# Patient Record
Sex: MALE | Race: WHITE | NOT HISPANIC OR LATINO | Employment: OTHER | URBAN - METROPOLITAN AREA
[De-identification: names, ages, dates, MRNs, and addresses within clinical notes are randomized per-mention and may not be internally consistent; named-entity substitution may affect disease eponyms.]

---

## 2022-10-16 ENCOUNTER — HOSPITAL ENCOUNTER (INPATIENT)
Facility: MEDICAL CENTER | Age: 82
LOS: 1 days | DRG: 871 | End: 2022-10-17
Attending: EMERGENCY MEDICINE | Admitting: STUDENT IN AN ORGANIZED HEALTH CARE EDUCATION/TRAINING PROGRAM
Payer: MEDICARE

## 2022-10-16 ENCOUNTER — APPOINTMENT (OUTPATIENT)
Dept: RADIOLOGY | Facility: MEDICAL CENTER | Age: 82
DRG: 871 | End: 2022-10-16
Attending: EMERGENCY MEDICINE
Payer: MEDICARE

## 2022-10-16 DIAGNOSIS — E53.8 VITAMIN B12 DEFICIENCY: ICD-10-CM

## 2022-10-16 DIAGNOSIS — D50.9 IRON DEFICIENCY ANEMIA, UNSPECIFIED IRON DEFICIENCY ANEMIA TYPE: ICD-10-CM

## 2022-10-16 DIAGNOSIS — R10.10 PAIN OF UPPER ABDOMEN: ICD-10-CM

## 2022-10-16 DIAGNOSIS — K92.2 GASTROINTESTINAL HEMORRHAGE, UNSPECIFIED GASTROINTESTINAL HEMORRHAGE TYPE: ICD-10-CM

## 2022-10-16 DIAGNOSIS — J69.0 ASPIRATION PNEUMONIA OF RIGHT LOWER LOBE, UNSPECIFIED ASPIRATION PNEUMONIA TYPE (HCC): ICD-10-CM

## 2022-10-16 DIAGNOSIS — Z78.9 ALCOHOL USE: ICD-10-CM

## 2022-10-16 PROBLEM — E87.6 HYPOKALEMIA: Status: ACTIVE | Noted: 2022-10-16

## 2022-10-16 PROBLEM — E87.20 LACTIC ACIDOSIS: Status: ACTIVE | Noted: 2022-10-16

## 2022-10-16 PROBLEM — Z71.89 ADVANCE CARE PLANNING: Status: ACTIVE | Noted: 2022-10-16

## 2022-10-16 PROBLEM — K92.1 GASTROINTESTINAL HEMORRHAGE WITH MELENA: Status: ACTIVE | Noted: 2022-10-16

## 2022-10-16 PROBLEM — G93.41 ACUTE METABOLIC ENCEPHALOPATHY: Status: ACTIVE | Noted: 2022-10-16

## 2022-10-16 PROBLEM — A41.9 SEPSIS (HCC): Status: ACTIVE | Noted: 2022-10-16

## 2022-10-16 LAB
ABO + RH BLD: NORMAL
ABO GROUP BLD: NORMAL
ALBUMIN SERPL BCP-MCNC: 3.4 G/DL (ref 3.2–4.9)
ALBUMIN/GLOB SERPL: 1 G/DL
ALP SERPL-CCNC: 36 U/L (ref 30–99)
ALT SERPL-CCNC: <5 U/L (ref 2–50)
ANION GAP SERPL CALC-SCNC: 15 MMOL/L (ref 7–16)
APPEARANCE UR: CLEAR
APTT PPP: 30.3 SEC (ref 24.7–36)
AST SERPL-CCNC: 14 U/L (ref 12–45)
BACTERIA #/AREA URNS HPF: NEGATIVE /HPF
BASOPHILS # BLD AUTO: 0.4 % (ref 0–1.8)
BASOPHILS # BLD: 0.06 K/UL (ref 0–0.12)
BILIRUB SERPL-MCNC: 1.2 MG/DL (ref 0.1–1.5)
BILIRUB UR QL STRIP.AUTO: NEGATIVE
BLD GP AB SCN SERPL QL: NORMAL
BUN SERPL-MCNC: 12 MG/DL (ref 8–22)
CALCIUM SERPL-MCNC: 8.5 MG/DL (ref 8.5–10.5)
CFT BLD TEG: 5.4 MIN (ref 4.6–9.1)
CFT P HPASE BLD TEG: 5.2 MIN (ref 4.3–8.3)
CHLORIDE SERPL-SCNC: 97 MMOL/L (ref 96–112)
CLOT ANGLE BLD TEG: 75.1 DEGREES (ref 63–78)
CLOT LYSIS 30M P MA LENFR BLD TEG: 0 % (ref 0–2.6)
CO2 SERPL-SCNC: 24 MMOL/L (ref 20–33)
COLOR UR: ABNORMAL
CREAT SERPL-MCNC: 0.73 MG/DL (ref 0.5–1.4)
CT.EXTRINSIC BLD ROTEM: 1.1 MIN (ref 0.8–2.1)
EKG IMPRESSION: NORMAL
EOSINOPHIL # BLD AUTO: 0.16 K/UL (ref 0–0.51)
EOSINOPHIL NFR BLD: 1 % (ref 0–6.9)
EPI CELLS #/AREA URNS HPF: NEGATIVE /HPF
ERYTHROCYTE [DISTWIDTH] IN BLOOD BY AUTOMATED COUNT: 60.9 FL (ref 35.9–50)
FERRITIN SERPL-MCNC: 33.2 NG/ML (ref 22–322)
GFR SERPLBLD CREATININE-BSD FMLA CKD-EPI: 90 ML/MIN/1.73 M 2
GLOBULIN SER CALC-MCNC: 3.3 G/DL (ref 1.9–3.5)
GLUCOSE SERPL-MCNC: 177 MG/DL (ref 65–99)
GLUCOSE UR STRIP.AUTO-MCNC: NEGATIVE MG/DL
HCT VFR BLD AUTO: 42.7 % (ref 42–52)
HGB BLD-MCNC: 13 G/DL (ref 14–18)
HGB BLD-MCNC: 13.6 G/DL (ref 14–18)
HGB RETIC QN AUTO: 27.3 PG/CELL (ref 29–35)
HYALINE CASTS #/AREA URNS LPF: ABNORMAL /LPF
IMM GRANULOCYTES # BLD AUTO: 0.15 K/UL (ref 0–0.11)
IMM GRANULOCYTES NFR BLD AUTO: 1 % (ref 0–0.9)
IMM RETICS NFR: 22.9 % (ref 9.3–17.4)
INR PPP: 1.16 (ref 0.87–1.13)
INR PPP: 1.16 (ref 0.87–1.13)
IRON SATN MFR SERPL: 10 % (ref 15–55)
IRON SERPL-MCNC: 27 UG/DL (ref 50–180)
KETONES UR STRIP.AUTO-MCNC: 15 MG/DL
LACTATE SERPL-SCNC: 2 MMOL/L (ref 0.5–2)
LACTATE SERPL-SCNC: 4.9 MMOL/L (ref 0.5–2)
LEUKOCYTE ESTERASE UR QL STRIP.AUTO: NEGATIVE
LIPASE SERPL-CCNC: 10 U/L (ref 11–82)
LYMPHOCYTES # BLD AUTO: 0.42 K/UL (ref 1–4.8)
LYMPHOCYTES NFR BLD: 2.7 % (ref 22–41)
MAGNESIUM SERPL-MCNC: 1.3 MG/DL (ref 1.5–2.5)
MCF BLD TEG: 63.8 MM (ref 52–69)
MCF.PLATELET INHIB BLD ROTEM: 20.4 MM (ref 15–32)
MCH RBC QN AUTO: 24.2 PG (ref 27–33)
MCHC RBC AUTO-ENTMCNC: 30.4 G/DL (ref 33.7–35.3)
MCV RBC AUTO: 79.4 FL (ref 81.4–97.8)
MICRO URNS: ABNORMAL
MONOCYTES # BLD AUTO: 0.82 K/UL (ref 0–0.85)
MONOCYTES NFR BLD AUTO: 5.2 % (ref 0–13.4)
NEUTROPHILS # BLD AUTO: 14.03 K/UL (ref 1.82–7.42)
NEUTROPHILS NFR BLD: 89.7 % (ref 44–72)
NITRITE UR QL STRIP.AUTO: NEGATIVE
NRBC # BLD AUTO: 0 K/UL
NRBC BLD-RTO: 0 /100 WBC
PH UR STRIP.AUTO: 5 [PH] (ref 5–8)
PLATELET # BLD AUTO: 270 K/UL (ref 164–446)
PMV BLD AUTO: 10.2 FL (ref 9–12.9)
POTASSIUM SERPL-SCNC: 2.9 MMOL/L (ref 3.6–5.5)
PROCALCITONIN SERPL-MCNC: 0.63 NG/ML
PROT SERPL-MCNC: 6.7 G/DL (ref 6–8.2)
PROT UR QL STRIP: NEGATIVE MG/DL
PROTHROMBIN TIME: 14.6 SEC (ref 12–14.6)
PROTHROMBIN TIME: 14.7 SEC (ref 12–14.6)
RBC # BLD AUTO: 5.38 M/UL (ref 4.7–6.1)
RBC # URNS HPF: ABNORMAL /HPF
RBC UR QL AUTO: ABNORMAL
RETICS # AUTO: 0.15 M/UL (ref 0.04–0.06)
RETICS/RBC NFR: 2.6 % (ref 0.8–2.1)
RH BLD: NORMAL
SODIUM SERPL-SCNC: 136 MMOL/L (ref 135–145)
SP GR UR STRIP.AUTO: 1.02
TEG ALGORITHM TGALG: NORMAL
TIBC SERPL-MCNC: 279 UG/DL (ref 250–450)
UIBC SERPL-MCNC: 252 UG/DL (ref 110–370)
UROBILINOGEN UR STRIP.AUTO-MCNC: 1 MG/DL
WBC # BLD AUTO: 15.6 K/UL (ref 4.8–10.8)
WBC #/AREA URNS HPF: ABNORMAL /HPF

## 2022-10-16 PROCEDURE — 700117 HCHG RX CONTRAST REV CODE 255: Performed by: EMERGENCY MEDICINE

## 2022-10-16 PROCEDURE — 99223 1ST HOSP IP/OBS HIGH 75: CPT | Mod: AI | Performed by: STUDENT IN AN ORGANIZED HEALTH CARE EDUCATION/TRAINING PROGRAM

## 2022-10-16 PROCEDURE — 85018 HEMOGLOBIN: CPT

## 2022-10-16 PROCEDURE — 86901 BLOOD TYPING SEROLOGIC RH(D): CPT

## 2022-10-16 PROCEDURE — 83735 ASSAY OF MAGNESIUM: CPT

## 2022-10-16 PROCEDURE — 93005 ELECTROCARDIOGRAM TRACING: CPT | Performed by: EMERGENCY MEDICINE

## 2022-10-16 PROCEDURE — 82728 ASSAY OF FERRITIN: CPT

## 2022-10-16 PROCEDURE — 85730 THROMBOPLASTIN TIME PARTIAL: CPT

## 2022-10-16 PROCEDURE — 700101 HCHG RX REV CODE 250: Performed by: STUDENT IN AN ORGANIZED HEALTH CARE EDUCATION/TRAINING PROGRAM

## 2022-10-16 PROCEDURE — 71045 X-RAY EXAM CHEST 1 VIEW: CPT

## 2022-10-16 PROCEDURE — 36415 COLL VENOUS BLD VENIPUNCTURE: CPT

## 2022-10-16 PROCEDURE — 70450 CT HEAD/BRAIN W/O DYE: CPT

## 2022-10-16 PROCEDURE — 85384 FIBRINOGEN ACTIVITY: CPT

## 2022-10-16 PROCEDURE — 700105 HCHG RX REV CODE 258: Performed by: EMERGENCY MEDICINE

## 2022-10-16 PROCEDURE — 700105 HCHG RX REV CODE 258: Performed by: STUDENT IN AN ORGANIZED HEALTH CARE EDUCATION/TRAINING PROGRAM

## 2022-10-16 PROCEDURE — 83605 ASSAY OF LACTIC ACID: CPT | Mod: 91

## 2022-10-16 PROCEDURE — 85046 RETICYTE/HGB CONCENTRATE: CPT

## 2022-10-16 PROCEDURE — C9113 INJ PANTOPRAZOLE SODIUM, VIA: HCPCS | Performed by: EMERGENCY MEDICINE

## 2022-10-16 PROCEDURE — 99285 EMERGENCY DEPT VISIT HI MDM: CPT

## 2022-10-16 PROCEDURE — 96365 THER/PROPH/DIAG IV INF INIT: CPT

## 2022-10-16 PROCEDURE — 83690 ASSAY OF LIPASE: CPT

## 2022-10-16 PROCEDURE — 86900 BLOOD TYPING SEROLOGIC ABO: CPT

## 2022-10-16 PROCEDURE — 87040 BLOOD CULTURE FOR BACTERIA: CPT

## 2022-10-16 PROCEDURE — 81001 URINALYSIS AUTO W/SCOPE: CPT

## 2022-10-16 PROCEDURE — 83540 ASSAY OF IRON: CPT

## 2022-10-16 PROCEDURE — 700101 HCHG RX REV CODE 250: Performed by: EMERGENCY MEDICINE

## 2022-10-16 PROCEDURE — 80053 COMPREHEN METABOLIC PANEL: CPT

## 2022-10-16 PROCEDURE — 770020 HCHG ROOM/CARE - TELE (206)

## 2022-10-16 PROCEDURE — 700111 HCHG RX REV CODE 636 W/ 250 OVERRIDE (IP): Performed by: EMERGENCY MEDICINE

## 2022-10-16 PROCEDURE — 74177 CT ABD & PELVIS W/CONTRAST: CPT

## 2022-10-16 PROCEDURE — 700111 HCHG RX REV CODE 636 W/ 250 OVERRIDE (IP)

## 2022-10-16 PROCEDURE — 83550 IRON BINDING TEST: CPT

## 2022-10-16 PROCEDURE — 86850 RBC ANTIBODY SCREEN: CPT

## 2022-10-16 PROCEDURE — 96375 TX/PRO/DX INJ NEW DRUG ADDON: CPT

## 2022-10-16 PROCEDURE — HZ2ZZZZ DETOXIFICATION SERVICES FOR SUBSTANCE ABUSE TREATMENT: ICD-10-PCS | Performed by: STUDENT IN AN ORGANIZED HEALTH CARE EDUCATION/TRAINING PROGRAM

## 2022-10-16 PROCEDURE — 93005 ELECTROCARDIOGRAM TRACING: CPT

## 2022-10-16 PROCEDURE — 85025 COMPLETE CBC W/AUTO DIFF WBC: CPT

## 2022-10-16 PROCEDURE — 85347 COAGULATION TIME ACTIVATED: CPT

## 2022-10-16 PROCEDURE — 82607 VITAMIN B-12: CPT

## 2022-10-16 PROCEDURE — 85610 PROTHROMBIN TIME: CPT

## 2022-10-16 PROCEDURE — 700111 HCHG RX REV CODE 636 W/ 250 OVERRIDE (IP): Performed by: STUDENT IN AN ORGANIZED HEALTH CARE EDUCATION/TRAINING PROGRAM

## 2022-10-16 PROCEDURE — 96367 TX/PROPH/DG ADDL SEQ IV INF: CPT

## 2022-10-16 PROCEDURE — 85576 BLOOD PLATELET AGGREGATION: CPT

## 2022-10-16 PROCEDURE — 84145 PROCALCITONIN (PCT): CPT

## 2022-10-16 RX ORDER — POTASSIUM CHLORIDE 7.45 MG/ML
10 INJECTION INTRAVENOUS
Status: DISPENSED | OUTPATIENT
Start: 2022-10-16 | End: 2022-10-16

## 2022-10-16 RX ORDER — POTASSIUM CHLORIDE 29.8 MG/ML
40 INJECTION INTRAVENOUS ONCE
Status: DISCONTINUED | OUTPATIENT
Start: 2022-10-16 | End: 2022-10-16

## 2022-10-16 RX ORDER — BISACODYL 10 MG
10 SUPPOSITORY, RECTAL RECTAL
Status: DISCONTINUED | OUTPATIENT
Start: 2022-10-16 | End: 2022-10-17 | Stop reason: HOSPADM

## 2022-10-16 RX ORDER — ACETAMINOPHEN 325 MG/1
650 TABLET ORAL EVERY 6 HOURS PRN
Status: DISCONTINUED | OUTPATIENT
Start: 2022-10-16 | End: 2022-10-17 | Stop reason: HOSPADM

## 2022-10-16 RX ORDER — GAUZE BANDAGE 2" X 2"
100 BANDAGE TOPICAL DAILY
Status: DISCONTINUED | OUTPATIENT
Start: 2022-10-17 | End: 2022-10-16

## 2022-10-16 RX ORDER — POTASSIUM CHLORIDE 7.45 MG/ML
10 INJECTION INTRAVENOUS
Status: DISCONTINUED | OUTPATIENT
Start: 2022-10-16 | End: 2022-10-16

## 2022-10-16 RX ORDER — ONDANSETRON 4 MG/1
4 TABLET, ORALLY DISINTEGRATING ORAL EVERY 4 HOURS PRN
Status: DISCONTINUED | OUTPATIENT
Start: 2022-10-16 | End: 2022-10-17 | Stop reason: HOSPADM

## 2022-10-16 RX ORDER — CEFTRIAXONE 2 G/1
2 INJECTION, POWDER, FOR SOLUTION INTRAMUSCULAR; INTRAVENOUS ONCE
Status: COMPLETED | OUTPATIENT
Start: 2022-10-16 | End: 2022-10-16

## 2022-10-16 RX ORDER — POLYETHYLENE GLYCOL 3350 17 G/17G
1 POWDER, FOR SOLUTION ORAL
Status: DISCONTINUED | OUTPATIENT
Start: 2022-10-16 | End: 2022-10-17 | Stop reason: HOSPADM

## 2022-10-16 RX ORDER — LORAZEPAM 2 MG/ML
2 INJECTION INTRAMUSCULAR
Status: DISCONTINUED | OUTPATIENT
Start: 2022-10-16 | End: 2022-10-17 | Stop reason: HOSPADM

## 2022-10-16 RX ORDER — LORAZEPAM 2 MG/1
4 TABLET ORAL
Status: DISCONTINUED | OUTPATIENT
Start: 2022-10-16 | End: 2022-10-17 | Stop reason: HOSPADM

## 2022-10-16 RX ORDER — AMOXICILLIN 250 MG
2 CAPSULE ORAL 2 TIMES DAILY
Status: DISCONTINUED | OUTPATIENT
Start: 2022-10-16 | End: 2022-10-17 | Stop reason: HOSPADM

## 2022-10-16 RX ORDER — LORAZEPAM 0.5 MG/1
0.5 TABLET ORAL EVERY 4 HOURS PRN
Status: DISCONTINUED | OUTPATIENT
Start: 2022-10-16 | End: 2022-10-17 | Stop reason: HOSPADM

## 2022-10-16 RX ORDER — PANTOPRAZOLE SODIUM 40 MG/10ML
40 INJECTION, POWDER, LYOPHILIZED, FOR SOLUTION INTRAVENOUS 2 TIMES DAILY
Status: DISCONTINUED | OUTPATIENT
Start: 2022-10-17 | End: 2022-10-17

## 2022-10-16 RX ORDER — METOPROLOL TARTRATE 1 MG/ML
5 INJECTION, SOLUTION INTRAVENOUS
Status: DISCONTINUED | OUTPATIENT
Start: 2022-10-16 | End: 2022-10-17 | Stop reason: HOSPADM

## 2022-10-16 RX ORDER — LORAZEPAM 2 MG/1
2 TABLET ORAL
Status: DISCONTINUED | OUTPATIENT
Start: 2022-10-16 | End: 2022-10-17 | Stop reason: HOSPADM

## 2022-10-16 RX ORDER — HALOPERIDOL 5 MG/ML
5 INJECTION INTRAMUSCULAR
Status: DISCONTINUED | OUTPATIENT
Start: 2022-10-16 | End: 2022-10-16

## 2022-10-16 RX ORDER — FOLIC ACID 1 MG/1
1 TABLET ORAL DAILY
Status: DISCONTINUED | OUTPATIENT
Start: 2022-10-17 | End: 2022-10-16

## 2022-10-16 RX ORDER — LORAZEPAM 1 MG/1
1 TABLET ORAL EVERY 4 HOURS PRN
Status: DISCONTINUED | OUTPATIENT
Start: 2022-10-16 | End: 2022-10-17 | Stop reason: HOSPADM

## 2022-10-16 RX ORDER — ONDANSETRON 2 MG/ML
4 INJECTION INTRAMUSCULAR; INTRAVENOUS EVERY 4 HOURS PRN
Status: DISCONTINUED | OUTPATIENT
Start: 2022-10-16 | End: 2022-10-17 | Stop reason: HOSPADM

## 2022-10-16 RX ORDER — HALOPERIDOL 5 MG/ML
5 INJECTION INTRAMUSCULAR
Status: COMPLETED | OUTPATIENT
Start: 2022-10-16 | End: 2022-10-16

## 2022-10-16 RX ORDER — SODIUM CHLORIDE, SODIUM LACTATE, POTASSIUM CHLORIDE, CALCIUM CHLORIDE 600; 310; 30; 20 MG/100ML; MG/100ML; MG/100ML; MG/100ML
INJECTION, SOLUTION INTRAVENOUS CONTINUOUS
Status: DISCONTINUED | OUTPATIENT
Start: 2022-10-16 | End: 2022-10-17

## 2022-10-16 RX ORDER — MAGNESIUM SULFATE HEPTAHYDRATE 40 MG/ML
2 INJECTION, SOLUTION INTRAVENOUS ONCE
Status: COMPLETED | OUTPATIENT
Start: 2022-10-17 | End: 2022-10-17

## 2022-10-16 RX ORDER — METRONIDAZOLE 500 MG/100ML
500 INJECTION, SOLUTION INTRAVENOUS ONCE
Status: COMPLETED | OUTPATIENT
Start: 2022-10-16 | End: 2022-10-16

## 2022-10-16 RX ADMIN — METRONIDAZOLE 500 MG: 5 INJECTION, SOLUTION INTRAVENOUS at 14:50

## 2022-10-16 RX ADMIN — POTASSIUM CHLORIDE 10 MEQ: 7.46 INJECTION, SOLUTION INTRAVENOUS at 19:32

## 2022-10-16 RX ADMIN — SODIUM CHLORIDE, POTASSIUM CHLORIDE, SODIUM LACTATE AND CALCIUM CHLORIDE: 600; 310; 30; 20 INJECTION, SOLUTION INTRAVENOUS at 18:01

## 2022-10-16 RX ADMIN — CEFTRIAXONE SODIUM 2 G: 2 INJECTION, POWDER, FOR SOLUTION INTRAMUSCULAR; INTRAVENOUS at 14:48

## 2022-10-16 RX ADMIN — IOHEXOL 89 ML: 350 INJECTION, SOLUTION INTRAVENOUS at 16:29

## 2022-10-16 RX ADMIN — SODIUM CHLORIDE 80 MG: 9 INJECTION, SOLUTION INTRAVENOUS at 16:52

## 2022-10-16 RX ADMIN — SODIUM CHLORIDE 3 G: 900 INJECTION INTRAVENOUS at 21:22

## 2022-10-16 RX ADMIN — POTASSIUM CHLORIDE 10 MEQ: 7.46 INJECTION, SOLUTION INTRAVENOUS at 18:01

## 2022-10-16 RX ADMIN — DOXYCYCLINE 100 MG: 100 INJECTION, POWDER, LYOPHILIZED, FOR SOLUTION INTRAVENOUS at 22:34

## 2022-10-16 RX ADMIN — POTASSIUM CHLORIDE 10 MEQ: 7.46 INJECTION, SOLUTION INTRAVENOUS at 22:35

## 2022-10-16 RX ADMIN — HALOPERIDOL LACTATE 5 MG: 5 INJECTION, SOLUTION INTRAMUSCULAR at 23:12

## 2022-10-16 ASSESSMENT — LIFESTYLE VARIABLES
EVER HAD A DRINK FIRST THING IN THE MORNING TO STEADY YOUR NERVES TO GET RID OF A HANGOVER: NO
TOTAL SCORE: 0
AVERAGE NUMBER OF DAYS PER WEEK YOU HAVE A DRINK CONTAINING ALCOHOL: 7
CONSUMPTION TOTAL: POSITIVE
TOTAL SCORE: 0
HAVE YOU EVER FELT YOU SHOULD CUT DOWN ON YOUR DRINKING: NO
ON A TYPICAL DAY WHEN YOU DRINK ALCOHOL HOW MANY DRINKS DO YOU HAVE: 5
TOTAL SCORE: 0
EVER FELT BAD OR GUILTY ABOUT YOUR DRINKING: NO
HOW MANY TIMES IN THE PAST YEAR HAVE YOU HAD 5 OR MORE DRINKS IN A DAY: 365
ALCOHOL_USE: YES
DOES PATIENT WANT TO STOP DRINKING: NO
ALCOHOL_USE: YES
HAVE PEOPLE ANNOYED YOU BY CRITICIZING YOUR DRINKING: NO

## 2022-10-16 ASSESSMENT — FIBROSIS 4 INDEX: FIB4 SCORE: 2.03

## 2022-10-16 ASSESSMENT — PAIN DESCRIPTION - PAIN TYPE: TYPE: ACUTE PAIN

## 2022-10-16 NOTE — ED PROVIDER NOTES
ED Provider Note    Scribed for Jerardo Richard M.D. by Eber Altamirano. 10/16/2022  1:17 PM    Primary care provider: No primary care provider on file.  Means of arrival: EMS  History obtained from: Patient  History limited by: None    CHIEF COMPLAINT  Chief Complaint   Patient presents with    ALOC     Pt was playing with his grandkids this afternoon. According to ems pt wife states he was his normals self, alert and oriented. Per wife pt family left and pt suddenly became altered, lethargic, and weak. Pt vomited and became incontinet. Pt arrived alert and oriented x 2, oriented to self and place. LKW at 1200, NIH 0 at this time. Pt found to be 80% on room air. Pt placed on 4L with improvement to 96% by ems. Pt 6L at this time, O2 87-90%.    N/V     On arrival.        HPI  Eder Cobos is a 82 y.o. male who presents to the Emergency Department for evaluation of weakness onset prior to arrival. Patient was sitting in his chair when his weakness began. He was unable to walk with his walker and couldn't move his feet. Wife reports associated confusion, incontinence, vomiting, and bloody stools. Wife notes the patient's confusion has resolved and he has a history of chronic diarrhea. Patient has been taking Ibuprofen, Aleve, and Imodium with minimal alleviation to symptoms. He admits to taking hypertension medications, folic acid, and iron but denies taking any blood thinners. Wife states the patient has 2 L of O2 at home but generally doesn't use it.      REVIEW OF SYSTEMS  Pertinent positives include: weakness, confusion, incontinence, vomiting, and bloody stools. See history of present illness. All other systems are negative.   C    PAST MEDICAL HISTORY   Hypertension    SURGICAL HISTORY  patient denies any surgical history    SOCIAL HISTORY      Social History     Substance and Sexual Activity   Drug Use None noted when reviewed       FAMILY HISTORY  None noted when reviewed    CURRENT MEDICATIONS  Home  "Medications       Reviewed by Osiris Santiago R.N. (Registered Nurse) on 10/16/22 at 1257  Med List Status: Not Addressed     Medication Last Dose Status        Patient Shoaib Taking any Medications                           ALLERGIES  No Known Allergies    PHYSICAL EXAM  VITAL SIGNS: BP (!) 147/65   Pulse 69   Temp 37.7 °C (99.9 °F) (Temporal)   Resp (!) 26   Ht 1.778 m (5' 10\")   Wt 81.6 kg (180 lb)   SpO2 88%   BMI 25.83 kg/m²     Constitutional: Alert. Covered in black poop.  HENT: No signs of trauma, Bilateral external ears normal, Nose normal. Uvula midline.   Eyes: Pupils are equal and reactive, Conjunctiva normal, Non-icteric.   Neck: Normal range of motion, No tenderness, Supple, No stridor.   Lymphatic: No lymphadenopathy noted.   Cardiovascular: Regular rate and rhythm, no murmurs.   Thorax & Lungs: Normal breath sounds, No respiratory distress, No wheezing, No chest tenderness.   Abdomen:  Soft, No tenderness, No peritoneal signs, No masses, No pulsatile masses.   Skin: Warm, Dry, No erythema, No rash.   Back: No bony tenderness, No CVA tenderness.   Extremities: Intact distal pulses, No edema, No tenderness, No cyanosis.  Musculoskeletal: Good range of motion in all major joints. No tenderness to palpation or major deformities noted.   Neurologic: Alert, Moves  all 4 extremities, swinging at staff members. Normal motor function, Normal sensory function.   Psychiatric: Affect normal, Judgment normal, Mood normal.     DIAGNOSTIC STUDIES / PROCEDURES    LABS  Labs Reviewed   COMP METABOLIC PANEL - Abnormal; Notable for the following components:       Result Value    Potassium 2.9 (*)     Glucose 177 (*)     All other components within normal limits   LIPASE - Abnormal; Notable for the following components:    Lipase 10 (*)     All other components within normal limits   LACTIC ACID - Abnormal; Notable for the following components:    Lactic Acid 4.9 (*)     All other components within normal " "limits   URINALYSIS - Abnormal; Notable for the following components:    Ketones 15 (*)     Occult Blood Trace (*)     All other components within normal limits   URINE MICROSCOPIC (W/UA) - Abnormal; Notable for the following components:    WBC 0-2 (*)     RBC 2-5 (*)     Hyaline Cast 3-5 (*)     All other components within normal limits   CBC WITH DIFFERENTIAL - Abnormal; Notable for the following components:    WBC 15.6 (*)     Hemoglobin 13.0 (*)     MCV 79.4 (*)     MCH 24.2 (*)     MCHC 30.4 (*)     RDW 60.9 (*)     Neutrophils-Polys 89.70 (*)     Lymphocytes 2.70 (*)     Immature Granulocytes 1.00 (*)     Neutrophils (Absolute) 14.03 (*)     Lymphs (Absolute) 0.42 (*)     Immature Granulocytes (abs) 0.15 (*)     All other components within normal limits    Narrative:     SPECIMEN IS A RECOLLECT   PROTHROMBIN TIME - Abnormal; Notable for the following components:    INR 1.16 (*)     All other components within normal limits    Narrative:     Indicate which anticoagulants the patient is on:->UNKNOWN   IRON/TOTAL IRON BIND - Abnormal; Notable for the following components:    Iron 27 (*)     % Saturation 10 (*)     All other components within normal limits    Narrative:     cant add retcs, lav tube was used for blood bank.   MAGNESIUM - Abnormal; Notable for the following components:    Magnesium 1.3 (*)     All other components within normal limits    Narrative:     cant add retcs, lav tube was used for blood bank.   PROCALCITONIN - Abnormal; Notable for the following components:    Procalcitonin 0.63 (*)     All other components within normal limits    Narrative:     cant add retcs, lav tube was used for blood bank.   LACTIC ACID   ESTIMATED GFR   COD (ADULT)   APTT    Narrative:     Indicate which anticoagulants the patient is on:->UNKNOWN   ABO RH CONFIRM   FERRITIN   BLOOD CULTURE    Narrative:     1 of 2 for Blood Culture x 2 sites order. Per Hospital  Policy: Only change Specimen Src: to \"Line\" if specified " "by  physician order.   BLOOD CULTURE    Narrative:     2 of 2 blood culture x2  Sites order. Per Hospital Policy:  Only change Specimen Src: to \"Line\" if specified by physician  order.   HGB   LACTIC ACID   BASIC TEG   PROTHROMBIN TIME   RETICULOCYTES COUNT   HGB      All labs reviewed by me.    EKG   Report   Date Value Ref Range Status   10/16/2022       [facility] Emergency Dept.    Test Date:  2022-10-16  Pt Name:    KIM ESPOSITO               Department: ER  MRN:        [id number]                      Room:        17  Gender:     Male                         Technician: MIGUEL LU  :        1940                   Requested By:ER TRIAGE PROTOCOL  Order #:    [id number]                    Reading MD:    Measurements  Intervals                                Axis  Rate:       77                           P:          13  CO:         175                          QRS:        -16  QRSD:       97                           T:          66  QT:         451  QTc:        511    Interpretive Statements  Sinus arrhythmia  Borderline left axis deviation  Minimal ST depression, anterolateral leads  Prolonged QT interval  No previous ECG available for comparison                RADIOLOGY  CT-ABDOMEN-PELVIS WITH   Final Result      1.  Diverticulosis without active diverticulitis.      2.  Aortic atherosclerosis without aneurysm.      CT-HEAD W/O   Final Result      1.  Cerebral atrophy.      2.  White matter lucencies most consistent with small vessel ischemic change versus demyelination or gliosis.      3.  Otherwise, Head CT without contrast with no acute findings. No evidence of acute cerebral infarction, hemorrhage or mass lesion.         DX-CHEST-PORTABLE (1 VIEW)   Final Result      Mild bibasilar opacities most likely represent atelectasis and/or scarring.        The radiologist's interpretation of all radiological studies have been reviewed by me.    COURSE & MEDICAL DECISION MAKING  Nursing " notes, VS, PMSFHx reviewed in chart.    82 y.o. male p/w chief complaint of weakness.    1:17 PM Patient seen and examined at bedside.      I verified that the patient was wearing a mask and I was wearing appropriate PPE every time I entered the room. The patient's mask was on the patient at all times during my encounter except for a brief view of the oropharynx.     The differential diagnoses include but are not limited to:      large bore IVs placed. Type & screen sent.  Tachycardic, normotensive, no hemorraghic shock.      Unclear etiology at this time.   CBC w/o acute blood loss anemia, no  thrombocytopenia.  BMP w/o uremia, no significant electrolyte abnormalities. Besides K.2.9  LFTs w/o hyperbilirubinemia.  INR w/o coagulopathy.  Patient admitted to Dr. Meza.        The total critical care time on this patient is 40 minutes, resuscitating patient, speaking with admitting physician, and deciphering test results. This 40 minutes is exclusive of separately billable procedures.      4:17 PM - Patient was at CT at this time.    5:21 PM - I discussed the patient's case and the above findings with Dr. Meza (hospitalist) who agrees to admit the patient.      DISPOSITION:  Patient will be hospitalized by Dr. Rodas in critical condition.     FINAL IMPRESSION  1. Gastrointestinal hemorrhage, unspecified gastrointestinal hemorrhage type       CCT:40 minutes.     IEber (Oswald), am scribing for, and in the presence of, Jerardo Richard M.D..    Electronically signed by: Eber Altamirano (Oswald), 10/16/2022    Jerardo PÉREZ M.D. personally performed the services described in this documentation, as scribed by Eber Altamirano in my presence, and it is both accurate and complete.    The note accurately reflects work and decisions made by me.  Jerardo Richard M.D.  10/16/2022  9:39 PM

## 2022-10-16 NOTE — ED NOTES
Pt wife states pt is supposed to wear O2 at home. Pt supposed to wear 2L but doesn't. Wife states she'll check his O2 from time to time and it's usually at 90% on RA.

## 2022-10-16 NOTE — ED NOTES
from Lab called with critical result of lactic acid 4.9 at 1416. Critical lab result read back to .   Dr. Richard notified of critical lab result at 1418.  Critical lab result read back by Dr. Richard.

## 2022-10-16 NOTE — ED TRIAGE NOTES
"Chief Complaint   Patient presents with    ALOC     Pt was playing with his grandkids this afternoon. According to ems pt wife states he was his normals self, alert and oriented. Per wife pt family left and pt suddenly became altered, lethargic, and weak. Pt vomited and became incontinet. Pt arrived alert and oriented x 2, oriented to self and place. LKW at 1200, NIH 0 at this time. Pt found to be 80% on room air. Pt placed on 4L with improvement to 96% by ems. Pt 6L at this time, O2 87-90%.    N/V     On arrival.      Blood Pressure : (!) 147/65, NIBP MAP (Calculated): 94, Pulse: 69, Respiration: (!) 26, Temperature: 37.7 °C (99.9 °F), Height: 177.8 cm (5' 10\"), Weight: 81.6 kg (180 lb), BMI (Calculated): 25.83, BSA (Calculated): 2, Pulse Oximetry: 88 %, O2 (LPM): 6, O2 Delivery Device: Silicone Nasal Cannula    "

## 2022-10-17 ENCOUNTER — PHARMACY VISIT (OUTPATIENT)
Dept: PHARMACY | Facility: MEDICAL CENTER | Age: 82
End: 2022-10-17
Payer: COMMERCIAL

## 2022-10-17 VITALS
WEIGHT: 211.86 LBS | HEART RATE: 90 BPM | OXYGEN SATURATION: 95 % | BODY MASS INDEX: 30.33 KG/M2 | HEIGHT: 70 IN | RESPIRATION RATE: 18 BRPM | DIASTOLIC BLOOD PRESSURE: 81 MMHG | SYSTOLIC BLOOD PRESSURE: 162 MMHG | TEMPERATURE: 98.1 F

## 2022-10-17 PROBLEM — D50.9 IRON DEFICIENCY ANEMIA: Status: ACTIVE | Noted: 2022-10-16

## 2022-10-17 PROBLEM — A41.9 SEPSIS (HCC): Status: RESOLVED | Noted: 2022-10-16 | Resolved: 2022-10-17

## 2022-10-17 LAB
ALBUMIN SERPL BCP-MCNC: 3.2 G/DL (ref 3.2–4.9)
ALBUMIN/GLOB SERPL: 1.1 G/DL
ALP SERPL-CCNC: 33 U/L (ref 30–99)
ALT SERPL-CCNC: 5 U/L (ref 2–50)
AMMONIA PLAS-SCNC: 13 UMOL/L (ref 11–45)
ANION GAP SERPL CALC-SCNC: 13 MMOL/L (ref 7–16)
AST SERPL-CCNC: 30 U/L (ref 12–45)
BASOPHILS # BLD AUTO: 0.2 % (ref 0–1.8)
BASOPHILS # BLD: 0.03 K/UL (ref 0–0.12)
BILIRUB SERPL-MCNC: 1.2 MG/DL (ref 0.1–1.5)
BUN SERPL-MCNC: 13 MG/DL (ref 8–22)
CALCIUM SERPL-MCNC: 8.3 MG/DL (ref 8.5–10.5)
CHLORIDE SERPL-SCNC: 101 MMOL/L (ref 96–112)
CO2 SERPL-SCNC: 26 MMOL/L (ref 20–33)
CREAT SERPL-MCNC: 0.75 MG/DL (ref 0.5–1.4)
EOSINOPHIL # BLD AUTO: 0.01 K/UL (ref 0–0.51)
EOSINOPHIL NFR BLD: 0.1 % (ref 0–6.9)
ERYTHROCYTE [DISTWIDTH] IN BLOOD BY AUTOMATED COUNT: 62.4 FL (ref 35.9–50)
GFR SERPLBLD CREATININE-BSD FMLA CKD-EPI: 89 ML/MIN/1.73 M 2
GLOBULIN SER CALC-MCNC: 2.8 G/DL (ref 1.9–3.5)
GLUCOSE SERPL-MCNC: 135 MG/DL (ref 65–99)
HCT VFR BLD AUTO: 44.1 % (ref 42–52)
HGB BLD-MCNC: 13.3 G/DL (ref 14–18)
IMM GRANULOCYTES # BLD AUTO: 0.09 K/UL (ref 0–0.11)
IMM GRANULOCYTES NFR BLD AUTO: 0.7 % (ref 0–0.9)
LACTATE SERPL-SCNC: 3.1 MMOL/L (ref 0.5–2)
LYMPHOCYTES # BLD AUTO: 0.6 K/UL (ref 1–4.8)
LYMPHOCYTES NFR BLD: 4.3 % (ref 22–41)
MAGNESIUM SERPL-MCNC: 1.6 MG/DL (ref 1.5–2.5)
MCH RBC QN AUTO: 24 PG (ref 27–33)
MCHC RBC AUTO-ENTMCNC: 30.2 G/DL (ref 33.7–35.3)
MCV RBC AUTO: 79.6 FL (ref 81.4–97.8)
MONOCYTES # BLD AUTO: 0.63 K/UL (ref 0–0.85)
MONOCYTES NFR BLD AUTO: 4.6 % (ref 0–13.4)
NEUTROPHILS # BLD AUTO: 12.44 K/UL (ref 1.82–7.42)
NEUTROPHILS NFR BLD: 90.1 % (ref 44–72)
NRBC # BLD AUTO: 0 K/UL
NRBC BLD-RTO: 0 /100 WBC
PHOSPHATE SERPL-MCNC: 2.7 MG/DL (ref 2.5–4.5)
PLATELET # BLD AUTO: 251 K/UL (ref 164–446)
PMV BLD AUTO: 9.1 FL (ref 9–12.9)
POTASSIUM SERPL-SCNC: 3.1 MMOL/L (ref 3.6–5.5)
PROT SERPL-MCNC: 6 G/DL (ref 6–8.2)
RBC # BLD AUTO: 5.54 M/UL (ref 4.7–6.1)
SODIUM SERPL-SCNC: 140 MMOL/L (ref 135–145)
T PALLIDUM AB SER QL IA: NORMAL
TSH SERPL DL<=0.005 MIU/L-ACNC: 2.69 UIU/ML (ref 0.38–5.33)
VIT B12 SERPL-MCNC: <150 PG/ML (ref 211–911)
WBC # BLD AUTO: 13.8 K/UL (ref 4.8–10.8)

## 2022-10-17 PROCEDURE — 83735 ASSAY OF MAGNESIUM: CPT

## 2022-10-17 PROCEDURE — 84443 ASSAY THYROID STIM HORMONE: CPT

## 2022-10-17 PROCEDURE — C9113 INJ PANTOPRAZOLE SODIUM, VIA: HCPCS | Performed by: STUDENT IN AN ORGANIZED HEALTH CARE EDUCATION/TRAINING PROGRAM

## 2022-10-17 PROCEDURE — 86780 TREPONEMA PALLIDUM: CPT

## 2022-10-17 PROCEDURE — 82140 ASSAY OF AMMONIA: CPT

## 2022-10-17 PROCEDURE — 700111 HCHG RX REV CODE 636 W/ 250 OVERRIDE (IP): Performed by: STUDENT IN AN ORGANIZED HEALTH CARE EDUCATION/TRAINING PROGRAM

## 2022-10-17 PROCEDURE — 700101 HCHG RX REV CODE 250

## 2022-10-17 PROCEDURE — 85025 COMPLETE CBC W/AUTO DIFF WBC: CPT

## 2022-10-17 PROCEDURE — 700111 HCHG RX REV CODE 636 W/ 250 OVERRIDE (IP)

## 2022-10-17 PROCEDURE — 700105 HCHG RX REV CODE 258: Performed by: STUDENT IN AN ORGANIZED HEALTH CARE EDUCATION/TRAINING PROGRAM

## 2022-10-17 PROCEDURE — 99239 HOSP IP/OBS DSCHRG MGMT >30: CPT | Performed by: STUDENT IN AN ORGANIZED HEALTH CARE EDUCATION/TRAINING PROGRAM

## 2022-10-17 PROCEDURE — 700102 HCHG RX REV CODE 250 W/ 637 OVERRIDE(OP): Performed by: STUDENT IN AN ORGANIZED HEALTH CARE EDUCATION/TRAINING PROGRAM

## 2022-10-17 PROCEDURE — 92610 EVALUATE SWALLOWING FUNCTION: CPT

## 2022-10-17 PROCEDURE — 700101 HCHG RX REV CODE 250: Performed by: STUDENT IN AN ORGANIZED HEALTH CARE EDUCATION/TRAINING PROGRAM

## 2022-10-17 PROCEDURE — 36415 COLL VENOUS BLD VENIPUNCTURE: CPT

## 2022-10-17 PROCEDURE — RXMED WILLOW AMBULATORY MEDICATION CHARGE: Performed by: STUDENT IN AN ORGANIZED HEALTH CARE EDUCATION/TRAINING PROGRAM

## 2022-10-17 PROCEDURE — 80053 COMPREHEN METABOLIC PANEL: CPT

## 2022-10-17 PROCEDURE — 84100 ASSAY OF PHOSPHORUS: CPT

## 2022-10-17 PROCEDURE — A9270 NON-COVERED ITEM OR SERVICE: HCPCS | Performed by: STUDENT IN AN ORGANIZED HEALTH CARE EDUCATION/TRAINING PROGRAM

## 2022-10-17 RX ORDER — DIAZEPAM 5 MG/ML
2.5 INJECTION, SOLUTION INTRAMUSCULAR; INTRAVENOUS
Status: DISCONTINUED | OUTPATIENT
Start: 2022-10-17 | End: 2022-10-17

## 2022-10-17 RX ORDER — FERROUS SULFATE 325(65) MG
325 TABLET ORAL DAILY
Qty: 30 TABLET | Refills: 1 | Status: SHIPPED | OUTPATIENT
Start: 2022-10-17

## 2022-10-17 RX ORDER — DOXYCYCLINE 100 MG/1
100 TABLET ORAL EVERY 12 HOURS
Qty: 10 TABLET | Refills: 0 | Status: SHIPPED | OUTPATIENT
Start: 2022-10-17 | End: 2022-10-22

## 2022-10-17 RX ORDER — HYDROMORPHONE HYDROCHLORIDE 1 MG/ML
0.5 INJECTION, SOLUTION INTRAMUSCULAR; INTRAVENOUS; SUBCUTANEOUS
Status: DISCONTINUED | OUTPATIENT
Start: 2022-10-17 | End: 2022-10-17 | Stop reason: HOSPADM

## 2022-10-17 RX ORDER — DIAZEPAM 5 MG/ML
2.5 INJECTION, SOLUTION INTRAMUSCULAR; INTRAVENOUS EVERY 6 HOURS PRN
Status: DISCONTINUED | OUTPATIENT
Start: 2022-10-17 | End: 2022-10-17

## 2022-10-17 RX ORDER — LIDOCAINE 50 MG/G
1 PATCH TOPICAL EVERY 24 HOURS
Status: DISCONTINUED | OUTPATIENT
Start: 2022-10-17 | End: 2022-10-17 | Stop reason: HOSPADM

## 2022-10-17 RX ORDER — AMOXICILLIN AND CLAVULANATE POTASSIUM 875; 125 MG/1; MG/1
1 TABLET, FILM COATED ORAL EVERY 12 HOURS
Qty: 10 TABLET | Refills: 0 | Status: SHIPPED | OUTPATIENT
Start: 2022-10-17 | End: 2022-10-22

## 2022-10-17 RX ORDER — CYANOCOBALAMIN 1000 UG/ML
1000 INJECTION, SOLUTION INTRAMUSCULAR; SUBCUTANEOUS DAILY
Status: DISCONTINUED | OUTPATIENT
Start: 2022-10-17 | End: 2022-10-17

## 2022-10-17 RX ORDER — OMEPRAZOLE 20 MG/1
20 CAPSULE, DELAYED RELEASE ORAL DAILY
Status: DISCONTINUED | OUTPATIENT
Start: 2022-10-17 | End: 2022-10-17 | Stop reason: HOSPADM

## 2022-10-17 RX ORDER — OMEPRAZOLE 20 MG/1
20 CAPSULE, DELAYED RELEASE ORAL DAILY
Qty: 14 CAPSULE | Refills: 0 | Status: SHIPPED | OUTPATIENT
Start: 2022-10-18 | End: 2022-11-01

## 2022-10-17 RX ORDER — POTASSIUM CHLORIDE 7.45 MG/ML
10 INJECTION INTRAVENOUS
Status: COMPLETED | OUTPATIENT
Start: 2022-10-17 | End: 2022-10-17

## 2022-10-17 RX ORDER — POTASSIUM CHLORIDE 20 MEQ/1
40 TABLET, EXTENDED RELEASE ORAL ONCE
Status: COMPLETED | OUTPATIENT
Start: 2022-10-17 | End: 2022-10-17

## 2022-10-17 RX ORDER — DOXYCYCLINE 100 MG/1
100 TABLET ORAL EVERY 12 HOURS
Status: DISCONTINUED | OUTPATIENT
Start: 2022-10-17 | End: 2022-10-17 | Stop reason: HOSPADM

## 2022-10-17 RX ORDER — GAUZE BANDAGE 2" X 2"
100 BANDAGE TOPICAL DAILY
Status: DISCONTINUED | OUTPATIENT
Start: 2022-10-17 | End: 2022-10-17 | Stop reason: HOSPADM

## 2022-10-17 RX ORDER — AMOXICILLIN AND CLAVULANATE POTASSIUM 875; 125 MG/1; MG/1
1 TABLET, FILM COATED ORAL EVERY 12 HOURS
Status: DISCONTINUED | OUTPATIENT
Start: 2022-10-17 | End: 2022-10-17 | Stop reason: HOSPADM

## 2022-10-17 RX ORDER — LANOLIN ALCOHOL/MO/W.PET/CERES
100 CREAM (GRAM) TOPICAL DAILY
Qty: 7 TABLET | Refills: 0 | Status: SHIPPED | OUTPATIENT
Start: 2022-10-18 | End: 2022-10-25

## 2022-10-17 RX ORDER — CYANOCOBALAMIN 1000 UG/ML
1000 INJECTION, SOLUTION INTRAMUSCULAR; SUBCUTANEOUS DAILY
Status: DISCONTINUED | OUTPATIENT
Start: 2022-10-17 | End: 2022-10-17 | Stop reason: HOSPADM

## 2022-10-17 RX ORDER — LIDOCAINE 50 MG/G
1 PATCH TOPICAL EVERY 24 HOURS
Qty: 10 PATCH | Refills: 0 | Status: SHIPPED | OUTPATIENT
Start: 2022-10-17

## 2022-10-17 RX ADMIN — POTASSIUM CHLORIDE 10 MEQ: 7.46 INJECTION, SOLUTION INTRAVENOUS at 01:25

## 2022-10-17 RX ADMIN — Medication 100 MG: at 12:42

## 2022-10-17 RX ADMIN — CYANOCOBALAMIN 1000 MCG: 1000 INJECTION, SOLUTION INTRAMUSCULAR; SUBCUTANEOUS at 05:57

## 2022-10-17 RX ADMIN — SODIUM CHLORIDE 3 G: 900 INJECTION INTRAVENOUS at 11:46

## 2022-10-17 RX ADMIN — POTASSIUM CHLORIDE 40 MEQ: 1500 TABLET, EXTENDED RELEASE ORAL at 17:01

## 2022-10-17 RX ADMIN — DOXYCYCLINE 100 MG: 100 INJECTION, POWDER, LYOPHILIZED, FOR SOLUTION INTRAVENOUS at 04:04

## 2022-10-17 RX ADMIN — METOPROLOL TARTRATE 5 MG: 1 INJECTION, SOLUTION INTRAVENOUS at 01:21

## 2022-10-17 RX ADMIN — SODIUM CHLORIDE 3 G: 900 INJECTION INTRAVENOUS at 05:46

## 2022-10-17 RX ADMIN — LIDOCAINE 1 PATCH: 50 PATCH TOPICAL at 17:01

## 2022-10-17 RX ADMIN — OMEPRAZOLE 20 MG: 20 CAPSULE, DELAYED RELEASE ORAL at 12:42

## 2022-10-17 RX ADMIN — DIAZEPAM 2.5 MG: 5 INJECTION, SOLUTION INTRAMUSCULAR; INTRAVENOUS at 02:09

## 2022-10-17 RX ADMIN — PANTOPRAZOLE SODIUM 40 MG: 40 INJECTION, POWDER, FOR SOLUTION INTRAVENOUS at 05:44

## 2022-10-17 RX ADMIN — MAGNESIUM SULFATE HEPTAHYDRATE 2 G: 40 INJECTION, SOLUTION INTRAVENOUS at 00:29

## 2022-10-17 ASSESSMENT — LIFESTYLE VARIABLES
AUDITORY DISTURBANCES: NOT PRESENT
HEADACHE, FULLNESS IN HEAD: NOT PRESENT
AGITATION: *
AGITATION: *
TOTAL SCORE: 7
TREMOR: NO TREMOR
VISUAL DISTURBANCES: NOT PRESENT
ORIENTATION AND CLOUDING OF SENSORIUM: ORIENTED AND CAN DO SERIAL ADDITIONS
NAUSEA AND VOMITING: NO NAUSEA AND NO VOMITING
ORIENTATION AND CLOUDING OF SENSORIUM: ORIENTED AND CAN DO SERIAL ADDITIONS
NAUSEA AND VOMITING: NO NAUSEA AND NO VOMITING
ANXIETY: MODERATELY ANXIOUS OR GUARDED, SO ANXIETY IS INFERRED
ANXIETY: *
PAROXYSMAL SWEATS: NO SWEAT VISIBLE
PAROXYSMAL SWEATS: NO SWEAT VISIBLE
HEADACHE, FULLNESS IN HEAD: NOT PRESENT
TOTAL SCORE: 4
AUDITORY DISTURBANCES: NOT PRESENT
VISUAL DISTURBANCES: NOT PRESENT
TREMOR: NO TREMOR

## 2022-10-17 NOTE — PROGRESS NOTES
Patient has removed multiple Ivs patient has been given haldol and now placed in bilateral wrist and four side rails.

## 2022-10-17 NOTE — DISCHARGE SUMMARY
Discharge Summary    CHIEF COMPLAINT ON ADMISSION  Chief Complaint   Patient presents with    ALOC     Pt was playing with his grandkids this afternoon. According to ems pt wife states he was his normals self, alert and oriented. Per wife pt family left and pt suddenly became altered, lethargic, and weak. Pt vomited and became incontinet. Pt arrived alert and oriented x 2, oriented to self and place. LKW at 1200, NIH 0 at this time. Pt found to be 80% on room air. Pt placed on 4L with improvement to 96% by ems. Pt 6L at this time, O2 87-90%.    N/V     On arrival.        Reason for Admission  EMS     Admission Date  10/16/2022    CODE STATUS  DNAR/DNI    HPI & HOSPITAL COURSE  This is a 82 y.o. male with a past medical history of hypertension, reported alcohol use, and progressive memory loss and periods of confusion per wife who presented to the hospital on 10/16/2022 with weakness, lethargy and mental status change that prompted her to call EMS.  On arrival patient was hypertensive with a systolic blood pressure in the 150s.  He was hypoxic requiring 6 L of supplemental oxygen.  Labs were remarkable for WBC of 15.6 with macrocytic anemia and hemoglobin of 13.  He was hypokalemic at 2.9 and a lactic acidosis of 4.9.  Lipase was negative.  CT of the head showed cerebral atrophy without acute pathology.  Chest x-ray showed mild bibasilar opacities.  Patient underwent abdomen pelvic CT which was benign he was Hemoccult positive.  Patient was admitted for possible sepsis due to aspiration pneumonia.  And started on IV antibiotics.  Overnight patient was becoming more agitated and aggressive towards staff and removed all IVs he required restraints due to safety.  Per wife patient does drink 5 whiskeys daily and CIWA protocol was ordered. He was unable to get many of his IV medications as he was a difficult stick and agitated, patient's mentation rapidly improved once wife was present and he was able to be removed from  restraints and was at his reported baseline per wife.  His electrolytes were replaced,  of note patient had severely low vitamin B12 and thus was given IV supplementation and will be discharged home with oral supplementation.  He was also iron deficient which is known and he  iron supplementation was prescribed.  Given his iron deficiency and occult blood he would benefit from colonoscopy however given age and what appears to be progressive cognitive decline recommend that he and his wife discussed this with her primary care doctor, he had no evidence of acute bleeding that would require urgent inpatient GI evaluation.   Patient was requiring 2 L of supplemental oxygen however discussion with his wife he has oxygen at home and has been needing it for several months however has been noncompliant with it.  We will send him home to complete a course of antibiotics for aspiration pneumonia however he appears to be at his respiratory baseline. His oxygen will be resumed and wife will bring it in for him to discharge home. He has been more compliant with face mask thus he will be given on discharge.     Patient does not appear overtly septic anymore and he is at his cognitive baseline per wife and is at his respiratory baseline as well per the new information provided.  Given his delirium overnight I think he would be best served going home with outpatient follow-up to avoid worsening delirium requiring restraints which could then result in further complications.  Wife was understanding of this and agreed with plan.    Patient would greatly benefit from further cognitive evaluation which can be done by his primary care doctor. He may benefit from neurology evaluation evaluation as well for evaluation and treatment of suspected progressive dementia.     At the time of discharge patient is hemodynamically stable, was able to ambulate with front wheel walker which is his baseline and stable on 2 L of supplemental oxygen.  He  is afebrile and normotensive.  He is at his cognitive baseline.  He does not appear to be actively withdrawing from alcohol.    Therefore, he is discharged in fair and stable condition to home with close outpatient follow-up.    The patient met 2-midnight criteria for an inpatient stay at the time of discharge.    Discharge Date  10/17/2022    FOLLOW UP ITEMS POST DISCHARGE  Iron and B12 replacement  O2 needs and titration  Dysphagia evaluation and risk for aspiration  Would benefit from geriatric evaluation and possibly neurology evaluation and a cognitive assessment for suspected underlying progressive dementia  Alcohol use    DISCHARGE DIAGNOSES  Principal Problem (Resolved):    Sepsis (HCC) POA: Yes  Active Problems:    Microcytic anemia POA: Yes    Hypokalemia POA: Yes    Lactic acidosis POA: Yes    Iron deficiency anemia POA: Yes    Acute metabolic encephalopathy POA: Yes    Aspiration pneumonia (HCC) POA: Yes    Advance care planning POA: Unknown      FOLLOW UP  No future appointments.  No follow-up provider specified.    MEDICATIONS ON DISCHARGE     Medication List        START taking these medications        Instructions   amoxicillin-clavulanate 875-125 MG Tabs  Commonly known as: AUGMENTIN   Take 1 Tablet by mouth every 12 hours for 5 days.  Dose: 1 Tablet     cyanocobalamin 1000 MCG Tabs  Commonly known as: VITAMIN B12   Take 1 Tablet by mouth every day for 7 days.  Dose: 1,000 mcg     doxycycline monohydrate 100 MG tablet  Commonly known as: ADOXA   Take 1 Tablet by mouth every 12 hours for 5 days.  Dose: 100 mg     ferrous sulfate 325 (65 Fe) MG tablet   Take 1 Tablet by mouth every day.  Dose: 325 mg     lidocaine 5 % Ptch  Commonly known as: LIDODERM   Place 1 Patch on the skin every 24 hours (12 hours on, 12 hours off)  Dose: 1 Patch     omeprazole 20 MG delayed-release capsule  Start taking on: October 18, 2022  Commonly known as: PRILOSEC   Take 1 Capsule by mouth every day for 14 days.  Dose: 20  mg     thiamine 100 MG tablet  Start taking on: October 18, 2022  Commonly known as: THIAMINE   Take 1 Tablet by mouth every day for 7 days.  Dose: 100 mg              Allergies  No Known Allergies    DIET  Orders Placed This Encounter   Procedures    Diet Order Diet: Regular     Standing Status:   Standing     Number of Occurrences:   1     Order Specific Question:   Diet:     Answer:   Regular [1]       ACTIVITY  As tolerated.      CONSULTATIONS  And a    PROCEDURES  And a    LABORATORY  Lab Results   Component Value Date    SODIUM 140 10/17/2022    POTASSIUM 3.1 (L) 10/17/2022    CHLORIDE 101 10/17/2022    CO2 26 10/17/2022    GLUCOSE 135 (H) 10/17/2022    BUN 13 10/17/2022    CREATININE 0.75 10/17/2022        Lab Results   Component Value Date    WBC 13.8 (H) 10/17/2022    HEMOGLOBIN 13.3 (L) 10/17/2022    HEMATOCRIT 44.1 10/17/2022    PLATELETCT 251 10/17/2022        Total time of the discharge process exceeds 35 minutes.

## 2022-10-17 NOTE — PROGRESS NOTES
Assumed care of patient, bedside report received from Mayra rao shift RN. Updated on plan of care, call light within reach and fall precautions are in place and bed lock and in lowest position. Patient instructed to call for assistance before getting out of bed. All questions answered at this time. No other needs.

## 2022-10-17 NOTE — ASSESSMENT & PLAN NOTE
Secondary to GI bleed  Pending iron levels  Frequent H&H  Transfuse if hemoglobin less than 7  Labs on a.m.

## 2022-10-17 NOTE — ASSESSMENT & PLAN NOTE
Noted for dark stools and occult blood positive at ED  Complicated by mild microcytic anemia  Likely from diverticulosis noted on CT imaging  Patient history of taking multiple NSAIDs, upper GI bleed not ruled out  Keep n.p.o. for now  IV hydration  IV PPI  Frequent H&H  Transfuse if hemoglobin less than 7  Labs on a.m.    I had extensive conversation with wife at bedside who reports that even if patient were to have a GI bleed he would not want to have any endoscopic interventions.

## 2022-10-17 NOTE — CARE PLAN
Problem: Knowledge Deficit - Standard  Goal: Patient and family/care givers will demonstrate understanding of plan of care, disease process/condition, diagnostic tests and medications  Outcome: Progressing     Problem: Hemodynamics  Goal: Patient's hemodynamics, fluid balance and neurologic status will be stable or improve  Outcome: Progressing     Problem: Fluid Volume  Goal: Fluid volume balance will be maintained  Outcome: Progressing     Problem: Respiratory  Goal: Patient will achieve/maintain optimum respiratory ventilation and gas exchange  Outcome: Progressing     Problem: Physical Regulation  Goal: Diagnostic test results will improve  Outcome: Progressing     Problem: Fall Risk  Goal: Patient will remain free from falls  Outcome: Progressing     Problem: Seizure Precautions  Goal: Implementation of seizure precautions  Outcome: Progressing     Problem: Safety - Medical Restraint  Goal: Remains free of injury from restraints (Restraint for Interference with Medical Device)  Outcome: Progressing   The patient is Stable - Low risk of patient condition declining or worsening    Shift Goals  Clinical Goals: safety  Patient Goals: sleep  Family Goals: safety    Progress made toward(s) clinical / shift goals:      Patient is not progressing towards the following goals:

## 2022-10-17 NOTE — ASSESSMENT & PLAN NOTE
Secondary to sepsis versus GI bleed  Noted for atelectasis on chest x-ray, concerning for aspiration  Work-up sepsis and GI bleed  IV hydration  Labs on a.m.

## 2022-10-17 NOTE — DISCHARGE PLANNING
Meds-to-Beds: Discharge prescription orders listed below delivered to patient's bedside. SHARRI Babin notified. Patient's wife counseled, she declined ferrous sulfate tablets. Patient elected to have co-payment billed to patient account.      Current Outpatient Medications   Medication Sig Dispense Refill    amoxicillin-clavulanate (AUGMENTIN) 875-125 MG Tab Take 1 Tablet by mouth every 12 hours for 5 days. 10 Tablet 0    doxycycline monohydrate (ADOXA) 100 MG tablet Take 1 Tablet by mouth every 12 hours for 5 days. 10 Tablet 0    lidocaine (LIDODERM) 5 % Patch Place 1 Patch on the skin every 24 hours (12 hours on, 12 hours off) 10 Patch 0    [START ON 10/18/2022] omeprazole (PRILOSEC) 20 MG delayed-release capsule Take 1 Capsule by mouth every day for 14 days. 14 Capsule 0    [START ON 10/18/2022] thiamine (THIAMINE) 100 MG tablet Take 1 Tablet by mouth every day for 7 days. 7 Tablet 0    cyanocobalamin (VITAMIN B12) 1000 MCG Tab Take 1 Tablet by mouth every day for 7 days. 7 Tablet 0      Angelica Bolanos, PharmD

## 2022-10-17 NOTE — PROGRESS NOTES
Patient became agitated and slipped out of one restraint. When this RN attempted to place patient back in restraint patient put their knee in this Rns stomach and pushed. This RN called for help and patient was safely placed back in restraints.

## 2022-10-17 NOTE — ASSESSMENT & PLAN NOTE
I had extensive conversation with patient's wife Susan at bedside regarding CODE STATUS.  She reports that patient would like to be DNR/DNI.  Order placed.  Additionally also discussed plan of action which may involve gastroenterology consult and endoscopy for which she reports that he would not like to have endoscopy to investigate any GI bleed or cancer.  Additionally, patient would not like to have NG tube placed for feeds.  Palliative care consult for goals of care.

## 2022-10-17 NOTE — H&P
Hospital Medicine History & Physical Note    Date of Service  10/16/2022    Primary Care Physician  Pcp Pt States None    Consultants  None    Code Status  DNAR/DNI    Chief Complaint  Chief Complaint   Patient presents with    ALOC     Pt was playing with his grandkids this afternoon. According to ems pt wife states he was his normals self, alert and oriented. Per wife pt family left and pt suddenly became altered, lethargic, and weak. Pt vomited and became incontinet. Pt arrived alert and oriented x 2, oriented to self and place. LKW at 1200, NIH 0 at this time. Pt found to be 80% on room air. Pt placed on 4L with improvement to 96% by ems. Pt 6L at this time, O2 87-90%.    N/V     On arrival.        History of Presenting Illness   82-year-old male with a past medical history of hypertension presents emergency department on 10/16/2022 via EMS after being noted for acute change in mental status, general weakness and lethargic.  Patient unable to provide HPI secondary to altered mental status.  Thus, such was obtained from chart review discussion with ED staff and patient's wife.  Patient's wife at bedside reporting that he has history of hypertension and memory loss and noted that today patient had a sudden onset coughing spell which followed general weakness for which she was unable to make it to the bathroom without assistance and patient be very lethargic which prompted her to call EMS.  Patient reports that she has been having a chronic history of coughing during meals and throughout the day and she would not be surprised if he has been aspirating.  No fever, chills, night sweats or loss of consciousness.    At the emergency department, vital signs with tachypnea and hypertension with SBP in 150s.  Patient on 6 L nasal cannula.  CBC with leukocytosis at 15.6 and macrocytic anemia with hemoglobin at 13.  Chemistry with hypokalemia at 2.9 and lactic acidosis at 4.9.  Lipase low.  Head CT with cerebral atrophy.   Chest x-ray with mild bibasilar opacities suggestive of atelectasis.  Abdominal/pelvic CT noted for diverticulosis without acute diverticulitis. Hemoccult positive.  Blood samples were collected for culture. He received 1 dose of IV Protonix, Haldol, Rocephin and metronidazole.  Patient was admitted for sepsis secondary to aspiration pneumonia and gastrointestinal bleed requiring telemetry monitoring and further work-up.    I discussed the plan of care with patient.    Review of Systems  Review of Systems   Unable to perform ROS: Mental status change     Past Medical History  Hypertension    Surgical History  Reviewed and nonpertinent to HPI    Family History  Family history reviewed with patient. There is no family history that is pertinent to the chief complaint.     Social History  Reviewed and nonpertinent to current HPI    Allergies  No Known Allergies    Medications  None       Physical Exam  Temp:  [37.7 °C (99.9 °F)] 37.7 °C (99.9 °F)  Pulse:  [68-80] 72  Resp:  [15-26] 15  BP: (141-167)/(64-77) 167/77  SpO2:  [88 %-96 %] 92 %  Blood Pressure : (!) 159/72   Temperature: 37.7 °C (99.9 °F)   Pulse: 68   Respiration: 18   Pulse Oximetry: 96 %       Physical Exam  Constitutional:       General: He is not in acute distress.     Appearance: Normal appearance. He is ill-appearing.   HENT:      Head: Normocephalic and atraumatic.      Nose: Nose normal. No congestion.      Mouth/Throat:      Mouth: Mucous membranes are moist.   Eyes:      Extraocular Movements: Extraocular movements intact.      Pupils: Pupils are equal, round, and reactive to light.   Cardiovascular:      Rate and Rhythm: Normal rate and regular rhythm.      Pulses: Normal pulses.      Heart sounds: Normal heart sounds.   Pulmonary:      Effort: Pulmonary effort is normal.      Breath sounds: Normal breath sounds.   Abdominal:      General: Bowel sounds are normal. There is no distension.      Palpations: Abdomen is soft.      Tenderness: There is  no abdominal tenderness. There is no guarding or rebound.   Musculoskeletal:         General: No swelling. Normal range of motion.      Cervical back: Normal range of motion and neck supple.      Right lower leg: No edema.      Left lower leg: No edema.   Skin:     General: Skin is warm.      Coloration: Skin is not jaundiced.   Neurological:      Mental Status: He is alert. He is disoriented.      Cranial Nerves: No cranial nerve deficit.       Laboratory:  Recent Labs     10/16/22  1448   WBC 15.6*   RBC 5.38   HEMOGLOBIN 13.0*   HEMATOCRIT 42.7   MCV 79.4*   MCH 24.2*   MCHC 30.4*   RDW 60.9*   PLATELETCT 270   MPV 10.2     Recent Labs     10/16/22  1204   SODIUM 136   POTASSIUM 2.9*   CHLORIDE 97   CO2 24   GLUCOSE 177*   BUN 12   CREATININE 0.73   CALCIUM 8.5     Recent Labs     10/16/22  1204   ALTSGPT <5   ASTSGOT 14   ALKPHOSPHAT 36   TBILIRUBIN 1.2   LIPASE 10*   GLUCOSE 177*     Recent Labs     10/16/22  1200   APTT 30.3   INR 1.16*     No results for input(s): NTPROBNP in the last 72 hours.      No results for input(s): TROPONINT in the last 72 hours.    Imaging:  CT-ABDOMEN-PELVIS WITH   Final Result      1.  Diverticulosis without active diverticulitis.      2.  Aortic atherosclerosis without aneurysm.      CT-HEAD W/O   Final Result      1.  Cerebral atrophy.      2.  White matter lucencies most consistent with small vessel ischemic change versus demyelination or gliosis.      3.  Otherwise, Head CT without contrast with no acute findings. No evidence of acute cerebral infarction, hemorrhage or mass lesion.         DX-CHEST-PORTABLE (1 VIEW)   Final Result      Mild bibasilar opacities most likely represent atelectasis and/or scarring.        Assessment/Plan:  Justification for Admission Status  I anticipate this patient will require at least two midnights for appropriate medical management, necessitating inpatient admission because of below      * Sepsis (HCC)- (present on admission)  Assessment &  Plan  This is Sepsis Present on admission  SIRS criteria identified on my evaluation include: Tachypnea, with respirations greater than 20 per minute and Leukocytosis, with WBC greater than 12,000  Source is lung versus GI  Sepsis protocol initiated  Fluid resuscitation ordered per protocol  IV antibiotics as appropriate for source of sepsis  Reassessment: I have reassessed the patient's hemodynamic status    Likely secondary to aspiration pneumonia  Wife reporting significant history of aspiration during meals and throughout the day  SIRS 2/4 and qSOFA 2/3  Chest x-ray with pulmonary infiltrates/atelectasis concerning for early pneumonia  IV antibiotics  Follow cultures  Pending procalcitonin    Aspiration pneumonia (HCC)- (present on admission)  Assessment & Plan  Causing sepsis and acute hypoxic respiratory failure  Significant aspiration risk as per wife and noted for multiple episodes  Keep n.p.o.  IV hydration  Speech evaluation  IV antibiotics  Labs on a.m.    Advance care planning  Assessment & Plan  I had extensive conversation with patient's wife Susan at bedside regarding CODE STATUS.  She reports that patient would like to be DNR/DNI.  Order placed.  Additionally also discussed plan of action which may involve gastroenterology consult and endoscopy for which she reports that he would not like to have endoscopy to investigate any GI bleed or cancer.  Additionally, patient would not like to have NG tube placed for feeds.  Palliative care consult for goals of care.    Acute metabolic encephalopathy- (present on admission)  Assessment & Plan  Secondary to sepsis versus GI bleed  Noted for atelectasis on chest x-ray, concerning for aspiration  Work-up sepsis and GI bleed  IV hydration  Labs on a.m.    Gastrointestinal hemorrhage with melena- (present on admission)  Assessment & Plan  Noted for dark stools and occult blood positive at ED  Complicated by mild microcytic anemia  Likely from diverticulosis noted  on CT imaging  Patient history of taking multiple NSAIDs, upper GI bleed not ruled out  Keep n.p.o. for now  IV hydration  IV PPI  Frequent H&H  Transfuse if hemoglobin less than 7  Labs on a.m.    I had extensive conversation with wife at bedside who reports that even if patient were to have a GI bleed he would not want to have any endoscopic interventions.    Lactic acidosis- (present on admission)  Assessment & Plan  Secondary Sepsis versus GI bleed  Improved with IV hydration   Trend    Hypokalemia- (present on admission)  Assessment & Plan  Replete as necessary    Microcytic anemia- (present on admission)  Assessment & Plan  Secondary to GI bleed  Pending iron levels  Frequent H&H  Transfuse if hemoglobin less than 7  Labs on a.m.      VTE prophylaxis: SCDs/TEDs

## 2022-10-17 NOTE — DISCHARGE PLANNING
Case Management Discharge Planning    Admission Date: 10/16/2022  GMLOS: 5  ALOS: 1    6-Clicks ADL Score:  none  6-Clicks Mobility Score:  none      Anticipated Discharge Dispo: Discharge Disposition: Discharged to home/self care (01)    DME Needed: No    Action(s) Taken: Pt pending medical clearance, pt currently on 3.5 liters O2, none at baseline.  PT/OT recs pending. Case management needs pending O2 needs and clinical course.    Escalations Completed: None    Medically Clear: No    Next Steps: f/u with medical team and pt to discuss dc needs and barriers.    Barriers to Discharge: Medical clearance

## 2022-10-17 NOTE — PROGRESS NOTES
4 Eyes Skin Assessment Completed by SHARRI Rossi and SHARRI Heredia.    Head Redness  Ears Redness and Blanching  Nose Redness   Mouth WDL  Neck WDL  Breast/Chest WDL  Shoulder Blades WDL  Spine WDL  (R) Arm/Elbow/Hand Bruising  (L) Arm/Elbow/Hand Bruising  Abdomen WDL  Groin WDL  Scrotum/Coccyx/Buttocks WDL  (R) Leg WDL  (L) Leg WDL  (R) Heel/Foot/Toe Blanching and Boggy  (L) Heel/Foot/Toe Blanching and Boggy          Devices In Places Tele Box, Blood Pressure Cuff, Pulse Ox, and Oxy Mask      Interventions In Place Pillows and Pressure Redistribution Mattress    Possible Skin Injury No    Pictures Uploaded Into Epic N/A  Wound Consult Placed N/A  RN Wound Prevention Protocol Ordered Yes

## 2022-10-17 NOTE — ASSESSMENT & PLAN NOTE
This is Sepsis Present on admission  SIRS criteria identified on my evaluation include: Tachypnea, with respirations greater than 20 per minute and Leukocytosis, with WBC greater than 12,000  Source is lung versus GI  Sepsis protocol initiated  Fluid resuscitation ordered per protocol  IV antibiotics as appropriate for source of sepsis  Reassessment: I have reassessed the patient's hemodynamic status    Likely secondary to aspiration pneumonia  Wife reporting significant history of aspiration during meals and throughout the day  SIRS 2/4 and qSOFA 2/3  Chest x-ray with pulmonary infiltrates/atelectasis concerning for early pneumonia  IV antibiotics  Follow cultures  Pending procalcitonin

## 2022-10-17 NOTE — PROGRESS NOTES
NOC HOSPITALIST CROSS COVER    Notified by RN regarding patient becoming agitated, frequently ripping out IVs, and slapping and hitting at staff members.  He was was admitted with sepsis secondary to aspiration pneumonia and acute metabolic encephalopathy.  Per the bedside RN, the patient's wife reported that he drinks 5 whiskey and dawn on a daily basis.  Unfortunately, he is a very difficult stick and will need ultrasound-guided IV access, but has required multiple attempts to regain IV access an at this time has multiple electrolyte abnormalities that need IV repletion due to his strict n.p.o. status for failing his swallow eval.     Vitals:    10/17/22 0121   BP:    Pulse: (!) 140   Resp:    Temp:    SpO2:         Plan:  -Bilateral soft wrist restraints and for side rail restraints, de-escalate as able  -CIWA protocol implemented  -IV diazepam as needed for agitation until patient is cleared by speech  -Magnesium noted to be 1.3, replete with 2 g IV mag sulfate  -Vitamin B12 ordered and resulted at <150   -Cyanocobalamin thousand mcg IM daily x5 doses    -----------------------------------------------------------------------------------------------------------    Electronically signed by:  LUIS ANGEL Olivier AGACNP-BC  Hospitalist Services

## 2022-10-17 NOTE — PROGRESS NOTES
Pt tolerated walking a total of 60 ft on 2L oxymask. Pt did have complaints of R sided abdominal pain 10/10.

## 2022-10-17 NOTE — THERAPY
"Speech Language Pathology   Clinical Swallow Evaluation     Patient Name: Eder Cobos  AGE:  82 y.o., SEX:  male  Medical Record #: [id number]  Today's Date: 10/17/2022       HPI: 83 y/o M admitted 10/16/22 with ALOC, general weakness, lethargy, incontinence after sudden onset of coughing spell. Wife reports pt has been coughing during meals at home. Head CT with cerebral atrophy.  Chest x-ray with mild bibasilar opacities suggestive of atelectasis. Patient was admitted for sepsis secondary to aspiration pneumonia and gastrointestinal bleed requiring telemetry monitoring and further work-up. Not seen by SLP in this facility before.     PMHx: EtOH abuse (5 whiskeys daily), HTN, memory loss, dysphagia.     Level of Consciousness: Alert  Affect/Behavior: Calm, irritable but cooperative  Follows Directives: Yes  Orientation: Self, , General place  Hearing: Hearing impaired but functional w/ loud clear speech  Vision: Functional vision    Prior Living Situation & Level of Function:  Patient lives w/ his wife. Regular/thin diet at baseline.     Oral Mechanism Evaluation  Facial Symmetry: Equal  Facial Sensation: Equal  Labial Observations: WFL  Lingual Observations: Midline  Dentition: Full dentures  Comments:    Voice  Quality: WFL  Resonance: WFL  Intensity: Appropriate  Cough: WFL  Comments:    Current Method of Nutrition   NPO until cleared by speech pathology      Subjective  Patient denies any difficulty swallowing at baseline. However, his wife reports he has times where food gets stuck in his chest and he coughs back it up. She reports he used to eat Tums \"like candy\" for years but then it seemed to get better and he hasn't needed them.     Assessment  Positioning: Clayton's (60-90 degrees)  Bolus Administration: SLP and Patient  Oxygen Requirements:  3.5L via oxymask  Factor(s) Affecting Performance:  Limited appetite    Swallowing Trials  Ice: WFL  Thin Liquid (TN0): WFL  Liquidised (LQ3): WFL  Regular (RG7): " WFL    Comments:  Patient was able to self-feed. He demonstrated adequate labial seal for good oral containment, functional mastication, presumed intact lingual strength/coordination for bolus formation and posterior lingual propulsion for adequate oral clearance. Trace lingual residue post swallow cleared w/ thin liquid rinse. Pharyngeal swallow response appeared timely. Pt swallowed 1-2x per bolus with no concerns for pharyngeal inefficiency. No s/sx of aspiration occurred w/ PO intake. Pt denied globus sensation or need to regurgitate what he had eaten.      Clinical Impressions  No clinical s/sx of oropharyngeal dysphagia appreciated today. Based on spouse's report of hx food getting stuck in his chest and coming back up intermittently at home, hx reliance on antacids, chronic EtOH use, along with acute hypoxic respiratory failure after a coughing a spell, I have a strong suspicion for esophageal dysphagia possibly contributing to an ascending aspiration event. Patient would benefit from a GI consult. SLP will f/u to ensure diet tolerance w/ reflux precautions.      Recommendations  1.  Regular/thin diet with reflux precautions  2.  Instrumentation: None indicated at this time  3.  Swallowing Instructions & Precautions:   Supervision: Independent  Positioning: Fully upright and midline during oral intake; remain upright for 30 mins after meals  Medication: Whole with liquid  Strategies: Small bites/sips, Slow rate of intake  Oral Care: BID  4.  GI consult to address suspected esophageal dysphagia.      Plan    Recommend Speech Therapy 2 times per week until therapy goals are met for the following treatments:  Dysphagia Training and Patient / Family / Caregiver Education.       Objective       10/17/22 0918   Patient / Family Goals   Patient / Family Goal #1 to go home   Short Term Goals   Short Term Goal # 1 Patient will consume meals of regular solids and thin liquids with no s/sx of aspiration given min cues  for reflux precautions.

## 2022-10-17 NOTE — ASSESSMENT & PLAN NOTE
Causing sepsis and acute hypoxic respiratory failure  Significant aspiration risk as per wife and noted for multiple episodes  Keep n.p.o.  IV hydration  Speech evaluation  IV antibiotics  Labs on a.m.

## 2022-10-21 LAB
BACTERIA BLD CULT: NORMAL
BACTERIA BLD CULT: NORMAL
SIGNIFICANT IND 70042: NORMAL
SIGNIFICANT IND 70042: NORMAL
SITE SITE: NORMAL
SITE SITE: NORMAL
SOURCE SOURCE: NORMAL
SOURCE SOURCE: NORMAL